# Patient Record
Sex: MALE | Race: WHITE
[De-identification: names, ages, dates, MRNs, and addresses within clinical notes are randomized per-mention and may not be internally consistent; named-entity substitution may affect disease eponyms.]

---

## 2017-01-21 ENCOUNTER — HOSPITAL ENCOUNTER (EMERGENCY)
Dept: HOSPITAL 25 - UCEAST | Age: 72
Discharge: HOME | End: 2017-01-21
Payer: MEDICARE

## 2017-01-21 VITALS — DIASTOLIC BLOOD PRESSURE: 73 MMHG | SYSTOLIC BLOOD PRESSURE: 151 MMHG

## 2017-01-21 DIAGNOSIS — N39.0: Primary | ICD-10-CM

## 2017-01-21 DIAGNOSIS — B96.89: ICD-10-CM

## 2017-01-21 DIAGNOSIS — I10: ICD-10-CM

## 2017-01-21 PROCEDURE — 87077 CULTURE AEROBIC IDENTIFY: CPT

## 2017-01-21 PROCEDURE — 87086 URINE CULTURE/COLONY COUNT: CPT

## 2017-01-21 PROCEDURE — 87186 SC STD MICRODIL/AGAR DIL: CPT

## 2017-01-21 PROCEDURE — 99212 OFFICE O/P EST SF 10 MIN: CPT

## 2017-01-21 PROCEDURE — G0463 HOSPITAL OUTPT CLINIC VISIT: HCPCS

## 2017-01-21 PROCEDURE — 81002 URINALYSIS NONAUTO W/O SCOPE: CPT

## 2017-01-21 NOTE — UC
Complaint Male HPI





- HPI Summary


HPI Summary: 





TWO DAYS OF BURNING WITH URINATION AND INCREASED FREQUENCY. NO BLOOD IN URINE. 

NO BACK PAIN. NO KNOWN FEVER. NO ABDOMINAL PAIN. HAD UTI ABOUT TEN YEARS AGO, 

RESOLVED WITH ANTIBIOTICS. NO HISTORY OF PROSTATE CONCERNS. 





- History of Current Complaint


Chief Complaint: UC


Stated Complaint: FREQ URINATION


Time Seen by Provider: 01/21/17 10:19


Hx Obtained From: Patient


Onset/Duration: Gradual Onset, Lasting Days, Still Present


Timing: Constant


Severity Initially: Mild


Severity Currently: Moderate


Location: Suprapubic


Character: Burning


Aggravating Factor(s): Voiding


Associated Signs And Symptoms: Positive: Fever - MILD 99F, Dysuria





- Allergies/Home Medications


Allergies/Adverse Reactions: 


 Allergies











Allergy/AdvReac Type Severity Reaction Status Date / Time


 


No Known Allergies Allergy   Verified 01/21/17 10:07














PMH/Surg Hx/FS Hx/Imm Hx


Previously Healthy: Yes


Cardiovascular History Of: Reports: Hypertension





- Surgical History


Surgical History: Yes


Surgery Procedure, Year, and Place: "broken left knee" surgery 1980's.  left 

groin surgery.  Hernia surgery





- Family History


Known Family History: 


   Negative: Renal Disease





- Social History


Occupation: Retired


Lives: With Family


Alcohol Use: None


Substance Use Type: None


Smoking Status (MU): Never Smoked Tobacco





- Immunization History


Most Recent Influenza Vaccination: fall 2016





Review of Systems


Constitutional: Fever - 99F


Skin: Negative


Eyes: Negative


ENT: Negative


Respiratory: Negative


Cardiovascular: Negative


Gastrointestinal: Negative


Genitourinary: Dysuria, Frequency, Urgency


Motor: Negative


Neurovascular: Negative


Musculoskeletal: Negative


Neurological: Negative


Psychological: Negative


All Other Systems Reviewed And Are Negative: Yes





Physical Exam


Triage Information Reviewed: Yes


Appearance: Well-Appearing, No Pain Distress, Well-Nourished


Vital Signs: 


 Initial Vital Signs











Temp  99.5 F   01/21/17 10:09


 


Pulse  89   01/21/17 10:09


 


Resp  16   01/21/17 10:09


 


BP  151/73   01/21/17 10:09


 


Pulse Ox  96   01/21/17 10:09











Vital Signs Reviewed: Yes


Eye Exam: Normal


Eyes: Positive: Conjunctiva Clear


ENT Exam: Normal


ENT: Positive: Normal ENT inspection, Hearing grossly normal, TMs normal


Dental Exam: Normal


Neck exam: Normal


Neck: Positive: Supple, Nontender


Respiratory Exam: Normal


Respiratory: Positive: Chest non-tender, Lungs clear, Normal breath sounds


Cardiovascular Exam: Normal


Cardiovascular: Positive: RRR, No Murmur, Pulses Normal


Abdominal Exam: Normal


Abdomen Description: Positive: Nontender, No Organomegaly, Soft.  Negative: CVA 

Tenderness (R), CVA Tenderness (L)


Musculoskeletal Exam: Normal


Musculoskeletal: Positive: Strength Intact, ROM Intact, No Edema


Neurological Exam: Normal


Psychological Exam: Normal


Skin Exam: Normal





 Complaint Male Course/Dx





- Differential Dx/Diagnosis


Differential Diagnosis/HQI/PQRI: Urinary Tract Infection


Provider Diagnoses: URINARY TRACT INFECTION





Discharge





- Discharge Plan


Condition: Stable


Disposition: HOME


Prescriptions: 


Phenazopyridine TAB* [Pyridium TAB*] 100 mg PO TID PRN #15 tab


 PRN Reason: Pain


Sulfamethox/Trimethoprim DS* [Bactrim /160 TAB*] 1 tab PO BID #10 tab


Patient Education Materials:  Urinary Tract Infection in Men (ED)


Referrals: 


Hussain Evans MD [Primary Care Provider] - 


Rupert Duque MD [Medical Doctor] -

## 2018-04-14 ENCOUNTER — HOSPITAL ENCOUNTER (EMERGENCY)
Dept: HOSPITAL 25 - UCEAST | Age: 73
Discharge: HOME | End: 2018-04-14
Payer: MEDICARE

## 2018-04-14 VITALS — DIASTOLIC BLOOD PRESSURE: 103 MMHG | SYSTOLIC BLOOD PRESSURE: 191 MMHG

## 2018-04-14 DIAGNOSIS — K21.9: ICD-10-CM

## 2018-04-14 DIAGNOSIS — I10: Primary | ICD-10-CM

## 2018-04-14 PROCEDURE — 99211 OFF/OP EST MAY X REQ PHY/QHP: CPT

## 2018-04-14 PROCEDURE — G0463 HOSPITAL OUTPT CLINIC VISIT: HCPCS

## 2018-04-14 NOTE — UC
Lj BARRIOS Nikita, scribed for Mayank Caro MD on 04/14/18 at 1549 .





Headache HPI





- HPI Summary


HPI Summary: 


This patient is a 72 year old M presenting to [facility] with a chief complaint of HA 

since 1000 but has since resolved. The patient woke up this morning with 

elevated BP and the HA. The patient went to work and then came back home to 

take his BP again and it was 188/80 but his sx were resolved.  Repeat BP here 

was 184/102. The patient rates the pain 0/10 in severity. Symptoms aggravated 

by nothing. Symptoms alleviated by spontaneous resolution. Patient reports HTN. 

Patient denies HA at this time, CP, and SOB. The patient reports that he 

usually takes his BP meds at midnight and states that he usually doesnt have 

elevated BP.





- History Of Current Complaint


Chief Complaint: UCHeadache


Stated Complaint: HIGH BLOOD PRESSURE,HEADACHE


Time Seen by Provider: 04/14/18 15:35


Hx Obtained From: Patient


Onset/Duration: Sudden Onset, Lasting Hours, Resolved


Onset Of Symptoms: Sudden


Currently Pain Is: Current Pain Scale(0-10)=


Pain Intensity: 0


Pain Scale Used: 0-10 Numeric


Timing: Hours


Aggravating Factor(s): Nothing


Allevating Factor(s): Other (Noted In Comments) - spontaneous resolution


Associated Signs And Symptoms: Positive: Other (Noted In Comments) - Patient 

reports HTN. Patient denies HA at this time, CP, and SOB.





- Allergies/Home Medications


Allergies/Adverse Reactions: 


 Allergies











Allergy/AdvReac Type Severity Reaction Status Date / Time


 


No Known Allergies Allergy   Verified 04/14/18 15:21














PMH/Surg Hx/FS Hx/Imm Hx


Cardiovascular History: Hypertension


GI/ History: Gastroesophageal Reflux





- Surgical History


Surgical History: Yes


Surgery Procedure, Year, and Place: "broken left knee" surgery 1980's.  left 

groin surgery.  Hernia surgery





- Family History


Known Family History: Positive: Hypertension, Other


   Negative: Renal Disease


Family History: HLD





- Social History


Alcohol Use: None


Substance Use Type: None


Smoking Status (MU): Never Smoked Tobacco





- Immunization History


Most Recent Influenza Vaccination: fall 2016





Review of Systems


Respiratory: Other - denies SOB


Cardiovascular: Other - HTN; denies CP


Neurological: Headache - resolved now


All Other Systems Reviewed And Are Negative: Yes





Physical Exam





- Summary


Physical Exam Summary: 


VITAL SIGNS: Reviewed.


GENERAL: ~Patient is a well-developed and nourished MALE who is lying 

comfortable in the stretcher. ~Patient is not in any acute respiratory distress.


HEAD AND FACE: Normocephalic


EYES: PERRLA, EOMI x 2.


EARS: Hearing grossly intact.


MOUTH: Oropharynx within normal limits.


NECK: Supple, trachea is midline, no adenopathy, no JVD, no carotid bruit.


CHEST: Symmetric, no tenderness at palpation


LUNGS: Clear to auscultation bilaterally. No wheezing or crackles.


CVS: Regular rate and rhythm, S1 and S2 present, no murmurs or gallops 

appreciated.


ABDOMEN: Soft, non-tender. Bowel sounds are normal. No abdominal abnormal 

pulsations.


EXTREMITIES: Full ROM in all major joints, no edema, no cyanosis or clubbing.


NEURO: Alert and oriented x 3. No acute neurological deficits. Speech is normal 

and follows commands.


SKIN: Dry and warm


Triage Information Reviewed: Yes


Vital Signs: 


 Initial Vital Signs











Temp  98.2 F   04/14/18 15:19


 


Pulse  86   04/14/18 15:19


 


Resp  18   04/14/18 15:19


 


BP  191/103   04/14/18 15:19


 


Pulse Ox  99   04/14/18 15:19











Vital Signs Reviewed: Yes





Re-Evaluation





- Re-Evaluation


  ** First Eval


Re-Evaluation Time: 15:55


Comment: Discussed discharge plan with the patient.





Headache Course/Dx





- Course


Course Of Treatment: This patient is a 72 year old M presenting to [facility] with a 

chief complaint of HA that has since resolved and HTN since 1000. The patient 

was found to have increased BP in UC. He is asymptomatic. He has no complaints. 

He will take an extra dose of Diovan. Wait for 1 hour and repeat BP is elevated 

he will go to the EF for further management. The patient will follow up with 

PCP for better control of BP. In the UC, the patient was given Diovan. The 

patient will take an extra dose at home for BP meds and take his regular BP 

meds at midnight. If BP continues to be elevated, he was instructed to go to 

the ED for further care. He was also instructed to return to UC or ED if he 

develops CP, SOB, or HA. The pt is hemodynamically stable, alert and oriented 

x3. Plan of care was discussed with the patient, and patient understands and 

agrees. All questions were answered to patient satisfaction. There were no 

further complaints or concerns.





- Differential Dx/Diagnosis


Differential Diagnosis/HQI/PQRI: Migraine, Tension Headache


Provider Diagnoses: HTN





Discharge





- Sign-Out/Discharge


Documenting (check all that apply): Discharge





- Discharge Plan


Condition: Stable


Disposition: HOME


Patient Education Materials:  Chronic Hypertension (DC)


Referrals: 


Hussain Evans MD [Primary Care Provider] - 


Additional Instructions: 


FOLLOW UP WITH YOUR PRIMARY CARE PROVIDER WITHIN ONE WEEK FOR HIGH BLOOD 

PRESSURE NOTED TODAY.





RETURN TO URGENT CARE FOR ANY WORSENING OR NEW SYMPTOMS.





- Billing Disposition and Condition


Condition: STABLE


Disposition: HOME





The documentation as recorded by the Lj young Nikita accurately reflects the 

service I personally performed and the decisions made by me, Mayank Caro MD.